# Patient Record
Sex: MALE | Race: OTHER
[De-identification: names, ages, dates, MRNs, and addresses within clinical notes are randomized per-mention and may not be internally consistent; named-entity substitution may affect disease eponyms.]

---

## 2018-03-09 ENCOUNTER — HOSPITAL ENCOUNTER (OUTPATIENT)
Dept: HOSPITAL 72 - SUR | Age: 58
Discharge: HOME | End: 2018-03-09
Payer: COMMERCIAL

## 2018-03-09 VITALS — SYSTOLIC BLOOD PRESSURE: 134 MMHG | DIASTOLIC BLOOD PRESSURE: 75 MMHG

## 2018-03-09 VITALS — DIASTOLIC BLOOD PRESSURE: 87 MMHG | SYSTOLIC BLOOD PRESSURE: 135 MMHG

## 2018-03-09 VITALS — SYSTOLIC BLOOD PRESSURE: 138 MMHG | DIASTOLIC BLOOD PRESSURE: 78 MMHG

## 2018-03-09 VITALS — BODY MASS INDEX: 30.1 KG/M2 | WEIGHT: 215 LBS | HEIGHT: 71 IN

## 2018-03-09 VITALS — DIASTOLIC BLOOD PRESSURE: 79 MMHG | SYSTOLIC BLOOD PRESSURE: 134 MMHG

## 2018-03-09 VITALS — SYSTOLIC BLOOD PRESSURE: 139 MMHG | DIASTOLIC BLOOD PRESSURE: 87 MMHG

## 2018-03-09 VITALS — SYSTOLIC BLOOD PRESSURE: 129 MMHG | DIASTOLIC BLOOD PRESSURE: 84 MMHG

## 2018-03-09 VITALS — DIASTOLIC BLOOD PRESSURE: 77 MMHG | SYSTOLIC BLOOD PRESSURE: 135 MMHG

## 2018-03-09 VITALS — DIASTOLIC BLOOD PRESSURE: 79 MMHG | SYSTOLIC BLOOD PRESSURE: 140 MMHG

## 2018-03-09 VITALS — DIASTOLIC BLOOD PRESSURE: 79 MMHG | SYSTOLIC BLOOD PRESSURE: 144 MMHG

## 2018-03-09 VITALS — DIASTOLIC BLOOD PRESSURE: 81 MMHG | SYSTOLIC BLOOD PRESSURE: 127 MMHG

## 2018-03-09 DIAGNOSIS — M75.42: Primary | ICD-10-CM
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
## 2018-03-09 DIAGNOSIS — I10: ICD-10-CM
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
## 2018-03-09 DIAGNOSIS — S43.402A: ICD-10-CM
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
## 2018-03-09 LAB
APPEARANCE UR: CLEAR
APTT PPP: (no result) S
GLUCOSE UR STRIP-MCNC: NEGATIVE MG/DL
KETONES UR QL STRIP: NEGATIVE
LEUKOCYTE ESTERASE UR QL STRIP: (no result)
NITRITE UR QL STRIP: NEGATIVE
PH UR STRIP: 6.5 [PH] (ref 4.5–8)
PROT UR QL STRIP: NEGATIVE
SP GR UR STRIP: 1.01 (ref 1–1.03)
UROBILINOGEN UR-MCNC: NORMAL MG/DL (ref 0–1)

## 2018-03-09 PROCEDURE — 94003 VENT MGMT INPAT SUBQ DAY: CPT

## 2018-03-09 PROCEDURE — 81001 URINALYSIS AUTO W/SCOPE: CPT

## 2018-03-09 PROCEDURE — 29823 SHO ARTHRS SRG XTNSV DBRDMT: CPT

## 2018-03-09 PROCEDURE — 94150 VITAL CAPACITY TEST: CPT

## 2018-03-09 NOTE — OPERATIVE NOTE - PDOC
Operative Note


Operative Note


Pre-op Diagnosis:


left shoulder internal derangement


Procedure:


left shoulder arthroscopy, sad


Post-op Diagnosis:  same as pre-op plus


Operative Findings:  consistent w/pre-op dx studies


Anesthesia:  MAC


Specimen:  none


Complications:  none


Condition:  stable


Estimated Blood Loss:  none


Implant(s) used?:  AZUL Howard Mar 9, 2018 07:08

## 2018-03-09 NOTE — OPERATIVE NOTE - PDOC
Operative Note


Operative Note


Pre-op Diagnosis:


left shoulder internal derangement


Procedure:


left shoulder arthroscopy, sad


Post-op Diagnosis:  same as pre-op plus


Operative Findings:  consistent w/pre-op dx studies


Anesthesia:  regional, MAC


Specimen:  none


Complications:  none


Condition:  stable


Estimated Blood Loss:  none


Drains:  none


Implant(s) used?:  No











AZUL ARZATE Mar 9, 2018 07:13

## 2018-03-09 NOTE — PRE-PROCEDURE NOTE/ATTESTATION
Pre-Procedure Note/Attestation


Complete Prior to Procedure


Planned Procedure:  left


Procedure Narrative:


shoulder arthroscopy sad, possible rc repair





Indications for Procedure


Pre-Operative Diagnosis:


left shoulder internal derangement





Attestation


I attest that I discussed the nature of the procedure; its benefits; risks and 

complications; and alternatives (and the risks and benefits of such alternatives

), prior to the procedure, with the patient (or the patient's legal 

representative).





I attest that, if there was a reasonable possibility of needing a blood 

transfusion, the patient (or the patient's legal representative) was given the 

[facility] standardized written summary, pursuant 

to the [place] Blood Safety Act ([place] Health and Safety Code # 1645, as 

amended).





I attest that I re-evaluated the patient just prior to the surgery and that 

there has been no change in the patient's H&P, except as documented below:











AZUL ARZATE Mar 9, 2018 07:13

## 2018-03-09 NOTE — PRE-PROCEDURE NOTE/ATTESTATION
Pre-Procedure Note/Attestation


Complete Prior to Procedure


Planned Procedure:  left


Procedure Narrative:


shoulder arthroscopy, sad





Indications for Procedure


Pre-Operative Diagnosis:


left shoulder internal derangement





Attestation


I attest that I discussed the nature of the procedure; its benefits; risks and 

complications; and alternatives (and the risks and benefits of such alternatives

), prior to the procedure, with the patient (or the patient's legal 

representative).





I attest that, if there was a reasonable possibility of needing a blood 

transfusion, the patient (or the patient's legal representative) was given the 

[facility] standardized written summary, pursuant 

to the [place] Blood Safety Act ([place] Health and Safety Code # 1645, as 

amended).





I attest that I re-evaluated the patient just prior to the surgery and that 

there has been no change in the patient's H&P, except as documented below:











AZUL ARZATE Mar 9, 2018 07:08

## 2018-03-09 NOTE — 48 HOUR POST ANESTHESIA EVAL
Post Anesthesia Evaluation


Procedure:  L Shoulder Arthroscopy


Date of Evaluation:  Mar 9, 2018


Time of Evaluation:  12:43


Blood Pressure Systolic:  137


0:  74


Pulse Rate:  64


Respiratory Rate:  18


Temperature (Fahrenheit):  98.6


O2 Sat by Pulse Oximetry:  100


Airway:  patent


Nausea:  No


Vomiting:  No


Pain Intensity:  1


Hydration Status:  adequate


Cardiopulmonary Status:


Stable


Mental Status/LOC:  patient returned to baseline


Follow-up Care/Observations:


0


Post-Anesthesia Complications:


0


Follow-up care needed:  ready to discharge











Bowen Amador MD Mar 9, 2018 08:45

## 2018-03-09 NOTE — IMMEDIATE POST-OP EVALUATION
Immediate Post-Op Evalulation


Immediate Post-Op Evalulation


Procedure:  L Shoulder Arthroscopy


Date of Evaluation:  Mar 9, 2018


Time of Evaluation:  10:37


IV Fluids:  700 LR


Blood Products:  0


Estimated Blood Loss:  10


Urinary Output:  0


Blood Pressure Systolic:  139


Blood Pressure Diastolic:  81


Pulse Rate:  65


Respiratory Rate:  16


O2 Sat by Pulse Oximetry:  100


Temperature (Fahrenheit):  98.3


Pain Score (1-10):  1


Nausea:  No


Vomiting:  No


Complications


0


Patient Status:  awake, reacts, patent, none


Hydration Status:  adequate


Dru Grams Ancef IV


Given Within 1 Hr of Incision:  Yes


Time Given:  09:11











Bowen Amador MD Mar 9, 2018 08:44

## 2018-03-09 NOTE — ANETHESIA PREOPERATIVE EVAL
Anesthesia Pre-op PMH/ROS


General


Date of Evaluation:  Mar 9, 2018


Time of Evaluation:  08:41


Anesthesiologist:  Perry


ASA Score:  ASA 2


Mallampati Score


Class I : Soft palate, uvula, fauces, pillars visible


Class II: Soft palate, uvula, fauces visible


Class III: Soft palate, base of uvula visible


Class IV: Only hard plate visible


Mallampati Classification:  Class II


Surgeon:  Jackson


Diagnosis:  L Shoulder Pain


Surgical Procedure:  L Shouler Arthroscopy


Anesthesia History:  none


Family History:  no anesthesia problems


Allergies:  


Coded Allergies:  


     No Known Allergies (Unverified , 3/9/18)


Medications:  see eMAR





Past Medical History


Cardiovascular:  Reports: HTN


Other:  obesity - BMI 32





Anesthesia Pre-op Phys. Exam


Physician Exam





Last Vital Signs








  Date Time  Temp Pulse Resp B/P (MAP) Pulse Ox O2 Delivery O2 Flow Rate FiO2


 


3/9/18 07:17 97.8 60 18 129/84 97 Room Air  





 97.8       








Constitutional:  NAD


Neurologic:  CN 2-12 intact


Cardiovascular:  RRR


Respiratory:  CTA


Gastrointestinal:  S/NT/ND





Airway Exam


Mallampati Score:  Class II


MO:  full


ROM:  full


Teeth:  intact





Anesthesia Pre-op A/P


Risk Assessment & Plan


Assessment:


ASA 2


Plan:


GA, BIS, L Supraclavicular Block


Status Change Before Surgery:  No





Pre-Antibiotics


Dru Grams Ancef IV


Given Within 1 Hr of Incision:  Yes


Time Given:  09:11











Bowen Amador MD Mar 9, 2018 08:43

## 2018-03-09 NOTE — OPERATIVE NOTE - DICTATED
DATE OF OPERATION:  03/09/2018



PREOPERATIVE DIAGNOSES:

1. Left shoulder traumatic impingement syndrome.

2. Anterior labral tear/superior labrum anterior and posterior tear.



POSTOPERATIVE DIAGNOSES:

1. Type 1 anterior labral tear.

2. Traumatic impingement syndrome with bursitis.



PROCEDURE:

1. Left shoulder arthroscopy and extensive intra-articular

debridement.

2. Subacromial decompression bursectomy, release of the coracoacromial

ligament.



SURGEON:  Michael Paz M.D.



ANESTHESIA:  Interscalene with general.



INDICATION FOR PROCEDURE:  This is a pleasant gentleman who has had

progressive shoulder pain.  He had MRI, which showed some changes along

the labrum as well as evidence of radiographic impingement syndrome.  He

had a subacromial cortisone injection, had temporary relief of his

symptoms.  Therefore, elected to proceed with left shoulder arthroscopy

with subacromial decompression bursectomy.  Risks, limitations,

expectations, and complications of procedure were discussed in detail

including no improvement of pain, need for future surgery, risk of

anesthesia, medical complications, DVT, PE, and mortality risks.  All

questions were addressed.



DESCRIPTION OF PROCEDURE:  After informed consent was obtained, the patient

was brought to the operating room and placed supine under general

anesthesia.  The patient was then carefully placed in beach-chair

position.  Left shoulder was prepped and draped in a sterile manner.

Time-out was performed.



A posterolateral stab incision was then made.  Camera was entered into

the glenohumeral joint.  There was some fraying along the anterior labrum.

The superior labrum appeared to be intact.  The biceps tendon was intact.

The undersurface of rotator cuff was intact.  There were no

intraarticular loose bodies.  Shaver was then used to perform debridement

of the anterior labrum down to stable rim of tissue.  There was a negative

peel back test on the superior labrum.  With shoulder abduction and

external rotation, there was some fraying.  It was felt that formal repair

was not necessary.  Camera was then repositioned in the subacromial space.

There was significant hypertrophic bursal tissue with significant

acromial spur.  Acromioplasty was started from lateral medial and

completed posterior to anterior.  Complete bursectomy and release of CA

ligament was performed.  Once that was completed, instruments removed.

Portal sites were closed with 3-0 Monocryl sutures.  Steri-Strips and a

sterile dressing were applied.  The patient was awoken and taken to

recovery room with stable signs.



ESTIMATED BLOOD LOSS:  None.



COMPLICATION:  None.



SPECIMENS:  None.



IMPLANTS:  None.









  ______________________________________________

  Michael Paz M.D.





DR:  TIM

D:  03/09/2018 10:11

T:  03/09/2018 20:53

JOB#:  [id number]

CC: